# Patient Record
Sex: FEMALE | Race: BLACK OR AFRICAN AMERICAN | NOT HISPANIC OR LATINO | Employment: FULL TIME | URBAN - METROPOLITAN AREA
[De-identification: names, ages, dates, MRNs, and addresses within clinical notes are randomized per-mention and may not be internally consistent; named-entity substitution may affect disease eponyms.]

---

## 2023-04-26 DIAGNOSIS — E11.9 TYPE 2 DIABETES MELLITUS WITHOUT COMPLICATION, WITHOUT LONG-TERM CURRENT USE OF INSULIN (MULTI): Primary | ICD-10-CM

## 2023-04-26 RX ORDER — METFORMIN HYDROCHLORIDE 500 MG/1
TABLET, EXTENDED RELEASE ORAL 2 TIMES DAILY
COMMUNITY
Start: 2021-09-10 | End: 2023-04-26 | Stop reason: SDUPTHER

## 2023-04-27 RX ORDER — METFORMIN HYDROCHLORIDE 500 MG/1
500 TABLET, EXTENDED RELEASE ORAL 2 TIMES DAILY
Qty: 180 TABLET | Refills: 3 | Status: SHIPPED | OUTPATIENT
Start: 2023-04-27 | End: 2023-08-03 | Stop reason: DRUGHIGH

## 2023-05-03 ENCOUNTER — OFFICE VISIT (OUTPATIENT)
Dept: PRIMARY CARE | Facility: CLINIC | Age: 38
End: 2023-05-03
Payer: COMMERCIAL

## 2023-05-03 VITALS
HEIGHT: 64 IN | HEART RATE: 88 BPM | WEIGHT: 251 LBS | OXYGEN SATURATION: 96 % | BODY MASS INDEX: 42.85 KG/M2 | TEMPERATURE: 97.3 F | SYSTOLIC BLOOD PRESSURE: 129 MMHG | DIASTOLIC BLOOD PRESSURE: 80 MMHG

## 2023-05-03 DIAGNOSIS — E66.01 CLASS 3 SEVERE OBESITY DUE TO EXCESS CALORIES WITH SERIOUS COMORBIDITY AND BODY MASS INDEX (BMI) OF 40.0 TO 44.9 IN ADULT (MULTI): ICD-10-CM

## 2023-05-03 DIAGNOSIS — D50.9 IRON DEFICIENCY ANEMIA, UNSPECIFIED IRON DEFICIENCY ANEMIA TYPE: Primary | ICD-10-CM

## 2023-05-03 DIAGNOSIS — E11.9 TYPE 2 DIABETES MELLITUS WITHOUT COMPLICATION, WITHOUT LONG-TERM CURRENT USE OF INSULIN (MULTI): ICD-10-CM

## 2023-05-03 DIAGNOSIS — R05.1 ACUTE COUGH: ICD-10-CM

## 2023-05-03 LAB
ERYTHROCYTE DISTRIBUTION WIDTH (RATIO) BY AUTOMATED COUNT: 16.8 % (ref 11.5–14.5)
ERYTHROCYTE MEAN CORPUSCULAR HEMOGLOBIN CONCENTRATION (G/DL) BY AUTOMATED: 29 G/DL (ref 32–36)
ERYTHROCYTE MEAN CORPUSCULAR VOLUME (FL) BY AUTOMATED COUNT: 73 FL (ref 80–100)
ERYTHROCYTES (10*6/UL) IN BLOOD BY AUTOMATED COUNT: 4.23 X10E12/L (ref 4–5.2)
HBA1C MFR BLD: 7.7 % (ref 4.2–6.5)
HEMATOCRIT (%) IN BLOOD BY AUTOMATED COUNT: 31 % (ref 36–46)
HEMOGLOBIN (G/DL) IN BLOOD: 9 G/DL (ref 12–16)
HEMOGLOBIN (PG) IN RETICULOCYTES: 21 PG (ref 28–38)
IMMATURE RETIC FRACTION: 34.9 % (ref 0–16)
LEUKOCYTES (10*3/UL) IN BLOOD BY AUTOMATED COUNT: 4.7 X10E9/L (ref 4.4–11.3)
NRBC (PER 100 WBCS) BY AUTOMATED COUNT: 0 /100 WBC (ref 0–0)
PLATELETS (10*3/UL) IN BLOOD AUTOMATED COUNT: 262 X10E9/L (ref 150–450)
RETICULOCYTES (10*3/UL) IN BLOOD: 0.07 X10E12/L (ref 0.02–0.08)
RETICULOCYTES/100 ERYTHROCYTES IN BLOOD BY AUTOMATED COUNT: 1.6 % (ref 0.5–2)

## 2023-05-03 PROCEDURE — 83540 ASSAY OF IRON: CPT

## 2023-05-03 PROCEDURE — 82746 ASSAY OF FOLIC ACID SERUM: CPT

## 2023-05-03 PROCEDURE — 3074F SYST BP LT 130 MM HG: CPT | Performed by: INTERNAL MEDICINE

## 2023-05-03 PROCEDURE — 83036 HEMOGLOBIN GLYCOSYLATED A1C: CPT | Performed by: INTERNAL MEDICINE

## 2023-05-03 PROCEDURE — 83550 IRON BINDING TEST: CPT

## 2023-05-03 PROCEDURE — 99214 OFFICE O/P EST MOD 30 MIN: CPT | Performed by: INTERNAL MEDICINE

## 2023-05-03 PROCEDURE — 36415 COLL VENOUS BLD VENIPUNCTURE: CPT

## 2023-05-03 PROCEDURE — 85027 COMPLETE CBC AUTOMATED: CPT

## 2023-05-03 PROCEDURE — 85045 AUTOMATED RETICULOCYTE COUNT: CPT

## 2023-05-03 PROCEDURE — 1036F TOBACCO NON-USER: CPT | Performed by: INTERNAL MEDICINE

## 2023-05-03 PROCEDURE — 3079F DIAST BP 80-89 MM HG: CPT | Performed by: INTERNAL MEDICINE

## 2023-05-03 PROCEDURE — 3008F BODY MASS INDEX DOCD: CPT | Performed by: INTERNAL MEDICINE

## 2023-05-03 PROCEDURE — 3051F HG A1C>EQUAL 7.0%<8.0%: CPT | Performed by: INTERNAL MEDICINE

## 2023-05-03 RX ORDER — TIRZEPATIDE 5 MG/.5ML
5 INJECTION, SOLUTION SUBCUTANEOUS
Qty: 2 ML | Refills: 3 | Status: SHIPPED | OUTPATIENT
Start: 2023-05-03 | End: 2023-09-11

## 2023-05-03 RX ORDER — BENZONATATE 100 MG/1
100 CAPSULE ORAL 3 TIMES DAILY PRN
Qty: 15 CAPSULE | Refills: 0 | Status: SHIPPED | OUTPATIENT
Start: 2023-05-03 | End: 2023-05-08

## 2023-05-03 NOTE — PROGRESS NOTES
Subjective   Leigh Mendez is a 38 y.o. female who presents for Follow-up and Cough.  HPI  Past medical history includes iron deficiency anemia, morbid obesity and NIDDM.    Recently with persistent and worsening iron deficiency anemia.  No signs of bleeding, normal/stable menses and menstrual bleeding.  She is taking folate acid with her multivitamin but does not know if iron is in it, she is not taking adequate oral iron at this time certainly.  She reports she was scheduled for an appointment but that the office call to reschedule and now her appointment will be on 6/23/2023.    Also reports that she ran out of metformin and was off of it for about a week.  Even when she takes the metformin, the great majority of the time she is only taking it once at night.  She does take the weekly Trulicity.    Also complaining of lingering cough for the past 2 weeks, was having symptoms of seasonal allergies and took Claritin but also not taking it regularly.  No other red flags, no sick contacts, no other symptoms.    **COPIED FORWARD FOR REFERENCE**      See visit 8/12/2022 regarding summary of anxiety, panic attacks and depression.  Doing quite well overall.  She continues on sertraline 25 mg daily, has been working with a therapist and has been back to work where things are better overall, superior has apparently been more cognizant of treating her better.    She has NIDDM. See visit 9/10/2021 regarding diabetes history and prior treatments.  At that time hemoglobin A1c was over 11% and she was started on Metformin 500 mg twice daily and Trulicity.  By March 2022 A1c improved 7.2%.  Unfortunately, went off metformin with hemoglobin A1c increasing to 8.1%.  She has not missed any of her Trulicity.  Restarted metformin July 2022 along with Invokana but has had recurrent yeast infections and therefore discontinued.  No yeast infections since discontinuation.  She has been able to increase metformin to 500 mg ER twice daily  "and tolerating it fairly well. Misses one dose per day several times per week.  Walking daily with her dog until recently when he was attacked and . Trying to improve diet.  See visit 2022 for history of dietary habits and issues.    Denies any polydipsia and polyuria, no vision changes, no other symptoms.    Her hematologist is Dr. Bautista, had two iron infusions and started on folate. She does have a history of PRBC transfusions (after ).     Her OB/GYN is Dr. Boland and Dr. Fletcher. She has a history of  section, cervical incompetence and cerclage. She is .      She is , unfortunately her  is suffering from metastatic sarcoma, possible new lesion identified. They have one son, 10-year-old Jurgen. 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  Nonsmoker, no alcohol use, no recreational drug use.   Objective   /80   Pulse 88   Temp 36.3 °C (97.3 °F)   Ht 1.626 m (5' 4\")   Wt 114 kg (251 lb)   SpO2 96%   BMI 43.08 kg/m²    Physical Exam  Gen: NAD, pleasant, A&Ox3  HEENT: PERRL, EOMI, MMM, OP clear  Neck: supple, no thyromegaly, no JVD, normal carotid upstroke  Pulm: lungs CTAB, good air movement  CV: RRR, no m/r/g, 2+ DP pulses  Abd: NABS, soft, NT, ND no HSM, central adiposity  Ext: no peripheral edema  Neuro: CN II-XII intact, no focal sensory or motor deficits, normal reflexes   Assessment/Plan   Anxiety and depression: Overall improved  -Continue sertraline 25 mg at bedtime (not taking regularly), consider weaning off as she has been in remission for over 6 months and she is not taking it regularly and complains of somnolence with the medication  -Continue therapy follow-up and practicing coping skills    Lingering cough: Likely related to allergies, recommend continuing daily antihistamine, can take benzonatate as needed for cough    NIDDM: Significantly improved from>11% to 6.6% on 10/14/2022 , Today day 2023 up to 7.7%, partially due to medication " noncompliance  -Continue metformin 500 mg ER twice daily and continue switching from  Trulicity to Mounjaro  -SGLT2 inhibitor previously not tolerated due to yeast infections; even with rechallenge  - nutrition referral declined  -Lipid panel from 11/29/2021: , HDL 36, LDL 74,   -Blood pressure much improved; with pregnancy plans, may not be worth starting ACE inhibitor or ARB, same with statin    Morbid obesity: Improving, slowly but steadily; recommend bariatric evaluation, weight management, she will consider based on above    ADARSH: Did not f/u with Dr. Bautista and Dr. Boland, not taking iron; recheck labs today; start oral iron, reports that hematology appointment is pending    Strong suspicion of sleep apnea: Referred to sleep medicine, HST; not completed    Health maintenance  -Mammogram: low susp symptoms, referrred to [facility] center for eval  -Pap: 8/20/2021  -DEXA: NA  -Last colonoscopy: NA  -Smoking history: never  -BMI: 42.5  -Counseled regarding diet and exercise  -Immunizations: ?Tdap, also can get pneumo given comorbidities  -Followup in 3 months  Problem List Items Addressed This Visit    None  Visit Diagnoses       Type 2 diabetes mellitus without complication, without long-term current use of insulin (CMS/[facility])        Relevant Orders    POCT Glycosylated Hemoglobin (HGB A1C) docked device                 Alan Figueroa MD

## 2023-05-04 PROBLEM — E11.9 TYPE 2 DIABETES MELLITUS WITHOUT COMPLICATION, WITHOUT LONG-TERM CURRENT USE OF INSULIN (MULTI): Status: ACTIVE | Noted: 2023-05-04

## 2023-05-04 PROBLEM — E66.01 CLASS 3 SEVERE OBESITY DUE TO EXCESS CALORIES WITH SERIOUS COMORBIDITY AND BODY MASS INDEX (BMI) OF 40.0 TO 44.9 IN ADULT (MULTI): Status: ACTIVE | Noted: 2023-05-04

## 2023-05-04 PROBLEM — D50.9 IRON DEFICIENCY ANEMIA: Status: ACTIVE | Noted: 2023-05-04

## 2023-05-04 PROBLEM — F33.1 MDD (MAJOR DEPRESSIVE DISORDER), RECURRENT EPISODE, MODERATE (MULTI): Status: ACTIVE | Noted: 2023-05-04

## 2023-05-04 PROBLEM — E66.813 CLASS 3 SEVERE OBESITY DUE TO EXCESS CALORIES WITH SERIOUS COMORBIDITY AND BODY MASS INDEX (BMI) OF 40.0 TO 44.9 IN ADULT: Status: ACTIVE | Noted: 2023-05-04

## 2023-05-04 LAB
FOLATE (NG/ML) IN SER/PLAS: 8.8 NG/ML
IRON (UG/DL) IN SER/PLAS: 10 UG/DL (ref 35–150)
IRON BINDING CAPACITY (UG/DL) IN SER/PLAS: 387 UG/DL (ref 240–445)
IRON SATURATION (%) IN SER/PLAS: 3 % (ref 25–45)

## 2023-05-04 RX ORDER — SERTRALINE HYDROCHLORIDE 25 MG/1
25 TABLET, FILM COATED ORAL DAILY
COMMUNITY
Start: 2022-06-30 | End: 2024-04-15

## 2023-08-03 ENCOUNTER — OFFICE VISIT (OUTPATIENT)
Dept: PRIMARY CARE | Facility: CLINIC | Age: 38
End: 2023-08-03
Payer: COMMERCIAL

## 2023-08-03 VITALS
OXYGEN SATURATION: 100 % | DIASTOLIC BLOOD PRESSURE: 84 MMHG | SYSTOLIC BLOOD PRESSURE: 119 MMHG | BODY MASS INDEX: 42.23 KG/M2 | WEIGHT: 246 LBS | HEART RATE: 98 BPM | TEMPERATURE: 97.5 F

## 2023-08-03 DIAGNOSIS — E66.01 CLASS 3 SEVERE OBESITY DUE TO EXCESS CALORIES WITH SERIOUS COMORBIDITY AND BODY MASS INDEX (BMI) OF 40.0 TO 44.9 IN ADULT (MULTI): Primary | ICD-10-CM

## 2023-08-03 DIAGNOSIS — E11.9 TYPE 2 DIABETES MELLITUS WITHOUT COMPLICATION, WITHOUT LONG-TERM CURRENT USE OF INSULIN (MULTI): ICD-10-CM

## 2023-08-03 DIAGNOSIS — D50.9 IRON DEFICIENCY ANEMIA, UNSPECIFIED IRON DEFICIENCY ANEMIA TYPE: ICD-10-CM

## 2023-08-03 LAB — HBA1C MFR BLD: 6.6 % (ref 4.2–6.5)

## 2023-08-03 PROCEDURE — 83036 HEMOGLOBIN GLYCOSYLATED A1C: CPT | Performed by: INTERNAL MEDICINE

## 2023-08-03 PROCEDURE — 3079F DIAST BP 80-89 MM HG: CPT | Performed by: INTERNAL MEDICINE

## 2023-08-03 PROCEDURE — 3074F SYST BP LT 130 MM HG: CPT | Performed by: INTERNAL MEDICINE

## 2023-08-03 PROCEDURE — 3044F HG A1C LEVEL LT 7.0%: CPT | Performed by: INTERNAL MEDICINE

## 2023-08-03 PROCEDURE — 99214 OFFICE O/P EST MOD 30 MIN: CPT | Performed by: INTERNAL MEDICINE

## 2023-08-03 PROCEDURE — 1036F TOBACCO NON-USER: CPT | Performed by: INTERNAL MEDICINE

## 2023-08-03 PROCEDURE — 3008F BODY MASS INDEX DOCD: CPT | Performed by: INTERNAL MEDICINE

## 2023-08-03 RX ORDER — METFORMIN HYDROCHLORIDE 500 MG/1
500 TABLET, EXTENDED RELEASE ORAL
Qty: 180 TABLET | Refills: 3 | Status: SHIPPED | OUTPATIENT
Start: 2023-08-03 | End: 2023-10-17 | Stop reason: SDUPTHER

## 2023-08-03 NOTE — PROGRESS NOTES
Subjective   Leigh Mendez is a 38 y.o. female who presents for Follow-up.  HPI  Past medical history includes iron deficiency anemia, morbid obesity and NIDDM.    Interim:  -Telemedicine consult with LIYA Mena [facility] anemia clinic (2023).  Differential is boilerplate.  Large standard panel ordered, duplicating much of testing already done.  I reviewed those labs, no amazing discoveries noted.  Started on iron bis-glycinate 28 mg daily with vitamin C and iron rich foods with 4-week follow-up visit.  Still not tolerating oral iron, GI intolerance.    Has noticed appetite decrease since starting Mounjaro.  Down 5lbs since last visit.        **COPIED FORWARD FOR REFERENCE**      See visit 2022 regarding summary of anxiety, panic attacks and depression.  Doing quite well overall.  She continues on sertraline 25 mg daily, has been working with a therapist and has been back to work where things are better overall, superior has apparently been more cognizant of treating her better.    She has NIDDM. See visit 9/10/2021 regarding diabetes history and prior treatments.  At that time hemoglobin A1c was over 11% and she was started on Metformin 500 mg twice daily and Trulicity.  By 2022 A1c improved 7.2%.  Unfortunately, went off metformin with hemoglobin A1c increasing to 8.1%.  She has not missed any of her Trulicity.  Restarted metformin 2022 along with Invokana but has had recurrent yeast infections and therefore discontinued.  No yeast infections since discontinuation.  She has been able to increase metformin to 500 mg ER twice daily and tolerating it fairly well. Misses one dose per day several times per week.  Walking daily with her dog until recently when he was attacked and . Trying to improve diet.  See visit 2022 for history of dietary habits and issues.    Denies any polydipsia and polyuria, no vision changes, no other symptoms.    Her hematologist is Dr. Bautista, had two iron  infusions and started on folate. She does have a history of PRBC transfusions (after ).     Her OB/GYN is Dr. Boland and Dr. Fletcher. She has a history of  section, cervical incompetence and cerclage. She is .      She is , unfortunately her  is suffering from metastatic sarcoma, possible new lesion identified. They have one son, 10-year-old Jurgen. 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  Nonsmoker, no alcohol use, no recreational drug use.   Objective   /84   Pulse 98   Temp 36.4 °C (97.5 °F)   Wt 112 kg (246 lb)   SpO2 100%   BMI 42.23 kg/m²    Physical Exam  Gen: NAD, pleasant, A&Ox3  HEENT: PERRL, EOMI, MMM, OP clear  Neck: supple, no thyromegaly, no JVD, normal carotid upstroke  Pulm: lungs CTAB, good air movement  CV: RRR, no m/r/g, 2+ DP pulses  Abd: NABS, soft, NT, ND no HSM, central adiposity  Ext: no peripheral edema  Neuro: CN II-XII intact, no focal sensory or motor deficits, normal reflexes   Assessment/Plan   Anxiety and depression: Overall improved  -Continue sertraline 25 mg at bedtime (not taking regularly), consider weaning off as she has been in remission for over 6 months and she is not taking it regularly and complains of somnolence with the medication  -Continue therapy follow-up and practicing coping skills    Low energy: most likely due to anemia    NIDDM: Significantly improved from>11% to 6.6%   -Continue metformin 500 mg ER  daily (tolerance at higher doses) and continue  Mounjaro 5mg  -SGLT2 inhibitor previously not tolerated due to yeast infections; even with rechallenge  - nutrition referral declined  -Lipid panel from 2021: , HDL 36, LDL 74,   -Blood pressure much improved; with pregnancy plans, may not be worth starting ACE inhibitor or ARB, same with statin    Morbid obesity: Improving, slowly but steadily; recommend bariatric evaluation, weight management, she will consider based on above    ADARSH: unclear cause;  intolerant to oral iron, will follow-up with Heme to request iron infusions; continue with iron-rich diet    Strong suspicion of sleep apnea: Referred to sleep medicine, HST; not completed    Health maintenance  -Mammogram: low susp symptoms, referrred to breast center for eval  -Pap: 8/20/2021  -DEXA: NA  -Last colonoscopy: NA  -Smoking history: never  -BMI: 42.5  -Counseled regarding diet and exercise  -Immunizations: ?Tdap, also can get pneumo given comorbidities  -Followup in 3 months  Problem List Items Addressed This Visit       Type 2 diabetes mellitus without complication, without long-term current use of insulin (CMS/[facility])    Relevant Orders    POCT Glycosylated Hemoglobin (HGB A1C) docked device            Alan Figueroa MD

## 2023-09-10 DIAGNOSIS — E11.9 TYPE 2 DIABETES MELLITUS WITHOUT COMPLICATION, WITHOUT LONG-TERM CURRENT USE OF INSULIN (MULTI): ICD-10-CM

## 2023-09-10 DIAGNOSIS — E66.01 CLASS 3 SEVERE OBESITY DUE TO EXCESS CALORIES WITH SERIOUS COMORBIDITY AND BODY MASS INDEX (BMI) OF 40.0 TO 44.9 IN ADULT (MULTI): ICD-10-CM

## 2023-09-11 RX ORDER — TIRZEPATIDE 5 MG/.5ML
INJECTION, SOLUTION SUBCUTANEOUS
Qty: 4 ML | Refills: 0 | Status: SHIPPED | OUTPATIENT
Start: 2023-09-11 | End: 2023-10-05

## 2023-10-04 DIAGNOSIS — E66.01 CLASS 3 SEVERE OBESITY DUE TO EXCESS CALORIES WITH SERIOUS COMORBIDITY AND BODY MASS INDEX (BMI) OF 40.0 TO 44.9 IN ADULT (MULTI): ICD-10-CM

## 2023-10-04 DIAGNOSIS — E11.9 TYPE 2 DIABETES MELLITUS WITHOUT COMPLICATION, WITHOUT LONG-TERM CURRENT USE OF INSULIN (MULTI): ICD-10-CM

## 2023-10-05 RX ORDER — TIRZEPATIDE 5 MG/.5ML
INJECTION, SOLUTION SUBCUTANEOUS
Qty: 4 ML | Refills: 3 | Status: SHIPPED | OUTPATIENT
Start: 2023-10-05 | End: 2024-01-22

## 2023-10-17 DIAGNOSIS — E11.9 TYPE 2 DIABETES MELLITUS WITHOUT COMPLICATION, WITHOUT LONG-TERM CURRENT USE OF INSULIN (MULTI): ICD-10-CM

## 2023-10-17 RX ORDER — METFORMIN HYDROCHLORIDE 500 MG/1
500 TABLET, EXTENDED RELEASE ORAL
Qty: 180 TABLET | Refills: 3 | Status: SHIPPED | OUTPATIENT
Start: 2023-10-17

## 2023-10-28 ENCOUNTER — HOSPITAL ENCOUNTER (OUTPATIENT)
Dept: RADIOLOGY | Facility: EXTERNAL LOCATION | Age: 38
Discharge: HOME | End: 2023-10-28
Payer: COMMERCIAL

## 2023-10-28 DIAGNOSIS — S90.512A ABRASION OF ANKLE, LEFT, INITIAL ENCOUNTER: ICD-10-CM

## 2023-12-07 ENCOUNTER — OFFICE VISIT (OUTPATIENT)
Dept: PRIMARY CARE | Facility: CLINIC | Age: 38
End: 2023-12-07
Payer: COMMERCIAL

## 2023-12-07 VITALS
HEART RATE: 96 BPM | OXYGEN SATURATION: 96 % | DIASTOLIC BLOOD PRESSURE: 79 MMHG | TEMPERATURE: 97.3 F | WEIGHT: 246 LBS | BODY MASS INDEX: 42.23 KG/M2 | SYSTOLIC BLOOD PRESSURE: 117 MMHG

## 2023-12-07 DIAGNOSIS — F33.1 MDD (MAJOR DEPRESSIVE DISORDER), RECURRENT EPISODE, MODERATE (MULTI): ICD-10-CM

## 2023-12-07 DIAGNOSIS — E11.9 TYPE 2 DIABETES MELLITUS WITHOUT COMPLICATION, WITHOUT LONG-TERM CURRENT USE OF INSULIN (MULTI): ICD-10-CM

## 2023-12-07 DIAGNOSIS — D50.9 IRON DEFICIENCY ANEMIA, UNSPECIFIED IRON DEFICIENCY ANEMIA TYPE: ICD-10-CM

## 2023-12-07 DIAGNOSIS — E66.01 CLASS 3 SEVERE OBESITY DUE TO EXCESS CALORIES WITH SERIOUS COMORBIDITY AND BODY MASS INDEX (BMI) OF 40.0 TO 44.9 IN ADULT (MULTI): Primary | ICD-10-CM

## 2023-12-07 LAB — HBA1C MFR BLD: 6.3 % (ref 4.2–6.5)

## 2023-12-07 PROCEDURE — 3044F HG A1C LEVEL LT 7.0%: CPT | Performed by: INTERNAL MEDICINE

## 2023-12-07 PROCEDURE — 83036 HEMOGLOBIN GLYCOSYLATED A1C: CPT | Mod: CLIA WAIVED TEST | Performed by: INTERNAL MEDICINE

## 2023-12-07 PROCEDURE — 3008F BODY MASS INDEX DOCD: CPT | Performed by: INTERNAL MEDICINE

## 2023-12-07 PROCEDURE — 99214 OFFICE O/P EST MOD 30 MIN: CPT | Performed by: INTERNAL MEDICINE

## 2023-12-07 PROCEDURE — 1036F TOBACCO NON-USER: CPT | Performed by: INTERNAL MEDICINE

## 2023-12-07 PROCEDURE — 3078F DIAST BP <80 MM HG: CPT | Performed by: INTERNAL MEDICINE

## 2023-12-07 PROCEDURE — 3074F SYST BP LT 130 MM HG: CPT | Performed by: INTERNAL MEDICINE

## 2023-12-07 NOTE — PROGRESS NOTES
Subjective   Leigh Mendez is a 38 y.o. female who presents for Follow-up.  HPI  Past medical history includes iron deficiency anemia, morbid obesity and NIDDM.    Interim:  -Had follow-up with LIYA Mena, [facility] anemia clinic (2023); does not appear there is any cause or further diagnostics considered for her iron deficiency anemia.  Has had several Venofer infusions and recently recommended to try liquid Anitra iron.    Overall she is doing well, weight is stable.  She had noticed appetite decrease since starting Mounjaro with some weight loss initially but none since then.    See visit 2022 regarding summary of anxiety, panic attacks and depression.  Doing quite well overall.  She continues on sertraline 25 mg daily, has been working with a therapist and has been back to work where things are better overall, superior has apparently been more cognizant of treating her better.  She reports she tried getting off the sertraline and noticed worsening symptoms so has been back on it, comfortable with that, happy that it is working for her.    **COPIED FORWARD FOR REFERENCE**          She has NIDDM. See visit 9/10/2021 regarding diabetes history and prior treatments.  At that time hemoglobin A1c was over 11% and she was started on Metformin 500 mg twice daily and Trulicity.  By 2022 A1c improved 7.2%.  Unfortunately, went off metformin with hemoglobin A1c increasing to 8.1%.  She has not missed any of her Trulicity.  Restarted metformin 2022 along with Invokana but has had recurrent yeast infections and therefore discontinued.  No yeast infections since discontinuation.  She has been able to increase metformin to 500 mg ER twice daily and tolerating it fairly well. Misses one dose per day several times per week.  Walking daily with her dog until recently when he was attacked and . Trying to improve diet.  See visit 2022 for history of dietary habits and issues.    Denies any polydipsia  and polyuria, no vision changes, no other symptoms.    Her hematologist is Dr. Bautista, had two iron infusions and started on folate. She does have a history of PRBC transfusions (after ).     Her OB/GYN is Dr. Boland and Dr. Fletcher. She has a history of  section, cervical incompetence and cerclage. She is .      She is , unfortunately her  is suffering from metastatic sarcoma, possible new lesion identified. They have one son, 10-year-old Jurgen. 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  Nonsmoker, no alcohol use, no recreational drug use.   Objective   /79   Pulse 96   Temp 36.3 °C (97.3 °F)   Wt 112 kg (246 lb)   SpO2 96%   BMI 42.23 kg/m²    Physical Exam  Gen: NAD, pleasant, A&Ox3  HEENT: PERRL, EOMI, MMM, OP clear  Neck: supple, no thyromegaly, no JVD, normal carotid upstroke  Pulm: lungs CTAB, good air movement  CV: RRR, no m/r/g, 2+ DP pulses  Abd: NABS, soft, NT, ND no HSM, central adiposity  Ext: no peripheral edema  Neuro: CN II-XII intact, no focal sensory or motor deficits, normal reflexes   Assessment/Plan   Anxiety and depression: Overall improved  -Continue sertraline 25 mg at bedtime (not taking regularly), consider weaning off as she has been in remission for over 6 months and she is not taking it regularly and complains of somnolence with the medication  -Continue therapy follow-up and practicing coping skills    Low energy: most likely due to anemia; similarly improvement noted.    NIDDM: Significantly improved from>11% to 6.3%   -Continue metformin 500 mg ER  daily (tolerance at higher doses) and continue  Mounjaro 5mg  -SGLT2 inhibitor previously not tolerated due to yeast infections; even with rechallenge  - nutrition referral declined  -Lipid panel from 2023 reviewed, suboptimal but adequate  -blood pressure is well-controlled and not on Ace/ARB  - recommend statin for NIDDM despite adequate levels    Morbid obesity: Improving, slowly but  steadily; recommend bariatric evaluation, weight management, she will consider based on above    ADARSH: unclear cause; intolerant to oral iron, will follow-up with Heme; continue with iron-rich diet    Strong suspicion of sleep apnea: Referred to sleep medicine, HST; not completed, less of a concern to the patient since anemia has improved    Health maintenance  -Mammogram: low susp symptoms, referrred to breast center for eval  -Pap: 8/20/2021  -DEXA: NA  -Last colonoscopy: NA  -Smoking history: never  -BMI: 42.5  -Counseled regarding diet and exercise  -Immunizations: ?Tdap, also can get pneumo given comorbidities  -Followup in 3 months  Problem List Items Addressed This Visit       Type 2 diabetes mellitus without complication, without long-term current use of insulin (CMS/[facility])    Relevant Orders    POCT Glycosylated Hemoglobin (HGB A1C) docked device            Alan Figueroa MD

## 2024-01-22 DIAGNOSIS — E66.01 CLASS 3 SEVERE OBESITY DUE TO EXCESS CALORIES WITH SERIOUS COMORBIDITY AND BODY MASS INDEX (BMI) OF 40.0 TO 44.9 IN ADULT (MULTI): ICD-10-CM

## 2024-01-22 DIAGNOSIS — E11.9 TYPE 2 DIABETES MELLITUS WITHOUT COMPLICATION, WITHOUT LONG-TERM CURRENT USE OF INSULIN (MULTI): ICD-10-CM

## 2024-01-22 RX ORDER — TIRZEPATIDE 5 MG/.5ML
INJECTION, SOLUTION SUBCUTANEOUS
Qty: 4 ML | Refills: 3 | Status: SHIPPED | OUTPATIENT
Start: 2024-01-22 | End: 2024-05-28

## 2024-04-11 ENCOUNTER — OFFICE VISIT (OUTPATIENT)
Dept: PRIMARY CARE | Facility: CLINIC | Age: 39
End: 2024-04-11
Payer: COMMERCIAL

## 2024-04-11 VITALS
WEIGHT: 241 LBS | HEART RATE: 98 BPM | OXYGEN SATURATION: 96 % | TEMPERATURE: 97.3 F | DIASTOLIC BLOOD PRESSURE: 83 MMHG | SYSTOLIC BLOOD PRESSURE: 119 MMHG | BODY MASS INDEX: 41.37 KG/M2

## 2024-04-11 DIAGNOSIS — D50.9 IRON DEFICIENCY ANEMIA, UNSPECIFIED IRON DEFICIENCY ANEMIA TYPE: ICD-10-CM

## 2024-04-11 DIAGNOSIS — E11.9 TYPE 2 DIABETES MELLITUS WITHOUT COMPLICATION, WITHOUT LONG-TERM CURRENT USE OF INSULIN (MULTI): ICD-10-CM

## 2024-04-11 DIAGNOSIS — Z00.00 ENCOUNTER FOR WELLNESS EXAMINATION: Primary | ICD-10-CM

## 2024-04-11 DIAGNOSIS — E66.01 CLASS 3 SEVERE OBESITY DUE TO EXCESS CALORIES WITH SERIOUS COMORBIDITY AND BODY MASS INDEX (BMI) OF 40.0 TO 44.9 IN ADULT (MULTI): ICD-10-CM

## 2024-04-11 DIAGNOSIS — Z12.31 ENCOUNTER FOR SCREENING MAMMOGRAM FOR BREAST CANCER: ICD-10-CM

## 2024-04-11 LAB
ALBUMIN SERPL BCP-MCNC: 4.4 G/DL (ref 3.4–5)
ALP SERPL-CCNC: 75 U/L (ref 33–110)
ALT SERPL W P-5'-P-CCNC: 8 U/L (ref 7–45)
ANION GAP SERPL CALC-SCNC: 14 MMOL/L (ref 10–20)
AST SERPL W P-5'-P-CCNC: 9 U/L (ref 9–39)
BASOPHILS # BLD AUTO: 0.03 X10*3/UL (ref 0–0.1)
BASOPHILS NFR BLD AUTO: 0.5 %
BILIRUB SERPL-MCNC: 0.4 MG/DL (ref 0–1.2)
BUN SERPL-MCNC: 6 MG/DL (ref 6–23)
CALCIUM SERPL-MCNC: 9.9 MG/DL (ref 8.6–10.6)
CHLORIDE SERPL-SCNC: 102 MMOL/L (ref 98–107)
CHOLEST SERPL-MCNC: 194 MG/DL (ref 0–199)
CHOLESTEROL/HDL RATIO: 4.2
CO2 SERPL-SCNC: 26 MMOL/L (ref 21–32)
CREAT SERPL-MCNC: 0.63 MG/DL (ref 0.5–1.05)
EGFRCR SERPLBLD CKD-EPI 2021: >90 ML/MIN/1.73M*2
EOSINOPHIL # BLD AUTO: 0.29 X10*3/UL (ref 0–0.7)
EOSINOPHIL NFR BLD AUTO: 5 %
ERYTHROCYTE [DISTWIDTH] IN BLOOD BY AUTOMATED COUNT: 13.4 % (ref 11.5–14.5)
GLUCOSE SERPL-MCNC: 92 MG/DL (ref 74–99)
HBA1C MFR BLD: 6.4 % (ref 4.2–6.5)
HCT VFR BLD AUTO: 35.7 % (ref 36–46)
HDLC SERPL-MCNC: 46.4 MG/DL
HGB BLD-MCNC: 11.2 G/DL (ref 12–16)
HGB RETIC QN: 26 PG (ref 28–38)
IMM GRANULOCYTES # BLD AUTO: 0.01 X10*3/UL (ref 0–0.7)
IMM GRANULOCYTES NFR BLD AUTO: 0.2 % (ref 0–0.9)
IMMATURE RETIC FRACTION: 25.3 %
IRON SATN MFR SERPL: 8 % (ref 25–45)
IRON SERPL-MCNC: 29 UG/DL (ref 35–150)
LDLC SERPL CALC-MCNC: 118 MG/DL
LYMPHOCYTES # BLD AUTO: 1.57 X10*3/UL (ref 1.2–4.8)
LYMPHOCYTES NFR BLD AUTO: 27.1 %
MCH RBC QN AUTO: 24.1 PG (ref 26–34)
MCHC RBC AUTO-ENTMCNC: 31.4 G/DL (ref 32–36)
MCV RBC AUTO: 77 FL (ref 80–100)
MONOCYTES # BLD AUTO: 0.5 X10*3/UL (ref 0.1–1)
MONOCYTES NFR BLD AUTO: 8.6 %
NEUTROPHILS # BLD AUTO: 3.39 X10*3/UL (ref 1.2–7.7)
NEUTROPHILS NFR BLD AUTO: 58.6 %
NON HDL CHOLESTEROL: 148 MG/DL (ref 0–149)
NRBC BLD-RTO: 0 /100 WBCS (ref 0–0)
PLATELET # BLD AUTO: 281 X10*3/UL (ref 150–450)
POTASSIUM SERPL-SCNC: 3.5 MMOL/L (ref 3.5–5.3)
PROT SERPL-MCNC: 7.9 G/DL (ref 6.4–8.2)
RBC # BLD AUTO: 4.64 X10*6/UL (ref 4–5.2)
RETICS #: 0.08 X10*6/UL (ref 0.02–0.08)
RETICS/RBC NFR AUTO: 1.8 % (ref 0.5–2)
SODIUM SERPL-SCNC: 138 MMOL/L (ref 136–145)
TIBC SERPL-MCNC: 379 UG/DL (ref 240–445)
TRIGL SERPL-MCNC: 147 MG/DL (ref 0–149)
UIBC SERPL-MCNC: 350 UG/DL (ref 110–370)
VIT B12 SERPL-MCNC: 462 PG/ML (ref 211–911)
VLDL: 29 MG/DL (ref 0–40)
WBC # BLD AUTO: 5.8 X10*3/UL (ref 4.4–11.3)

## 2024-04-11 PROCEDURE — 85045 AUTOMATED RETICULOCYTE COUNT: CPT

## 2024-04-11 PROCEDURE — 83550 IRON BINDING TEST: CPT

## 2024-04-11 PROCEDURE — 36415 COLL VENOUS BLD VENIPUNCTURE: CPT

## 2024-04-11 PROCEDURE — 85025 COMPLETE CBC W/AUTO DIFF WBC: CPT

## 2024-04-11 PROCEDURE — 3008F BODY MASS INDEX DOCD: CPT | Performed by: INTERNAL MEDICINE

## 2024-04-11 PROCEDURE — 80061 LIPID PANEL: CPT

## 2024-04-11 PROCEDURE — 99214 OFFICE O/P EST MOD 30 MIN: CPT | Performed by: INTERNAL MEDICINE

## 2024-04-11 PROCEDURE — 1036F TOBACCO NON-USER: CPT | Performed by: INTERNAL MEDICINE

## 2024-04-11 PROCEDURE — 3074F SYST BP LT 130 MM HG: CPT | Performed by: INTERNAL MEDICINE

## 2024-04-11 PROCEDURE — 83036 HEMOGLOBIN GLYCOSYLATED A1C: CPT | Mod: CLIA WAIVED TEST | Performed by: INTERNAL MEDICINE

## 2024-04-11 PROCEDURE — 99396 PREV VISIT EST AGE 40-64: CPT | Performed by: INTERNAL MEDICINE

## 2024-04-11 PROCEDURE — 3079F DIAST BP 80-89 MM HG: CPT | Performed by: INTERNAL MEDICINE

## 2024-04-11 PROCEDURE — 3044F HG A1C LEVEL LT 7.0%: CPT | Performed by: INTERNAL MEDICINE

## 2024-04-11 PROCEDURE — 80053 COMPREHEN METABOLIC PANEL: CPT

## 2024-04-11 PROCEDURE — 83540 ASSAY OF IRON: CPT

## 2024-04-11 PROCEDURE — 82607 VITAMIN B-12: CPT

## 2024-04-11 NOTE — PROGRESS NOTES
Subjective   Leigh Mendez is a 39 y.o. female who presents for Annual Exam.  HPI  Past medical history includes iron deficiency anemia, morbid obesity and NIDDM.    Interim:  - has had iron infusions, reportedly improved but not normal; she recommended liquid iron    Overall she is doing well, weight is down 5 lbs.  She had noticed appetite decrease since starting Mounjaro with some weight loss but still craves sweets, lately will have at least one 5oz boxes of Milk Duds.  Does not always remember to take metformin either.  Helps her manage stress.    See visit 8/12/2022 regarding summary of anxiety, panic attacks and depression.  Doing quite well overall.  She continues on sertraline 25 mg but only takes it 4-5 times per week, feels like it makes her sleepy; completed therapy.  She reports she tried getting off the sertraline and noticed worsening symptoms so has been back on it, comfortable with that, happy that it is working for her.      She is , unfortunately her  is suffering from metastatic sarcoma, currently stable. They have one son, 10-year-old Jurgen. 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  Some exercise, walks the dog, has a gym membership but not going regularly.  Nonsmoker, no alcohol use, no recreational drug use.   Objective   /83   Pulse 98   Temp 36.3 °C (97.3 °F)   Wt 109 kg (241 lb)   SpO2 96%   BMI 41.37 kg/m²    Physical Exam  Gen: NAD, pleasant, A&Ox3  HEENT: PERRL, EOMI, MMM, OP clear  Neck: supple, no thyromegaly, no JVD, normal carotid upstroke  Pulm: lungs CTAB, good air movement  CV: RRR, no m/r/g, 2+ DP pulses  Abd: NABS, soft, NT, ND no HSM, central adiposity  Ext: no peripheral edema  Neuro: CN II-XII intact, no focal sensory or motor deficits, normal reflexes   Assessment/Plan     Anxiety and depression: Overall improved  -Continue sertraline 25 mg at bedtime (not taking regularly), tried weaning off but had partial relapse  -Continue practicing coping  skills acquired through therapist    Low energy: most likely due to anemia; similarly improvement noted.  Also with possible RADHA, not concerned today.    NIDDM: Significantly improved from>11% to 6.3%   -Continue metformin 500 mg ER  daily (intolerance at higher doses) and continue  Mounjaro 5mg  -SGLT2 inhibitor previously not tolerated due to yeast infections; even with rechallenge  - nutrition referral declined  -Lipid panel from 11/30/2023 reviewed, suboptimal   -blood pressure is well-controlled, not on Ace/ARB  - recommend statin for NIDDM despite adequate levels    Morbid obesity: Improving, slowly but steadily; recommend considering bariatric evaluation, weight management  -Extensive discussion today regarding diet especially sweets  -Continue Mounjaro, consider increasing    ADARSH: unclear cause; intolerant to oral iron  - Had follow-up with LIYA Mena [facility] anemia clinic (12/1/2023); does not appear there was any identified cause or further diagnostics considered for her iron deficiency anemia.  Has had several Venofer infusions and recently recommended to try liquid Anitra iron.    Strong suspicion of sleep apnea: Referred to sleep medicine, HST; not completed, less of a concern to the patient since anemia has improved    Health maintenance  -Mammogram: start screening mammograms  -Pap: 8/20/2021  -DEXA: NA  -Last colonoscopy: NA  -Smoking history: never  -BMI: 41.4  -Counseled regarding diet and exercise  -Immunizations: ?Tdap, also can get pneumo given comorbidities  -Followup in 4-6 months    Problem List Items Addressed This Visit       Type 2 diabetes mellitus without complication, without long-term current use of insulin (CMS/[facility])    Relevant Orders    POCT Glycosylated Hemoglobin (HGB A1C) docked device            Alan Figueroa MD

## 2024-04-15 DIAGNOSIS — F33.1 MDD (MAJOR DEPRESSIVE DISORDER), RECURRENT EPISODE, MODERATE (MULTI): Primary | ICD-10-CM

## 2024-04-15 DIAGNOSIS — E11.9 TYPE 2 DIABETES MELLITUS WITHOUT COMPLICATION, WITHOUT LONG-TERM CURRENT USE OF INSULIN (MULTI): ICD-10-CM

## 2024-04-15 RX ORDER — SERTRALINE HYDROCHLORIDE 25 MG/1
25 TABLET, FILM COATED ORAL DAILY
Qty: 90 TABLET | Refills: 3 | Status: SHIPPED | OUTPATIENT
Start: 2024-04-15 | End: 2025-04-15

## 2024-04-15 RX ORDER — SERTRALINE HYDROCHLORIDE 25 MG/1
25 TABLET, FILM COATED ORAL DAILY
Qty: 90 TABLET | Refills: 3 | OUTPATIENT
Start: 2024-04-15

## 2024-04-15 RX ORDER — METFORMIN HYDROCHLORIDE 500 MG/1
500 TABLET, EXTENDED RELEASE ORAL
Qty: 100 TABLET | Refills: 3 | Status: SHIPPED | OUTPATIENT
Start: 2024-04-15 | End: 2025-05-20

## 2024-05-26 DIAGNOSIS — E66.01 CLASS 3 SEVERE OBESITY DUE TO EXCESS CALORIES WITH SERIOUS COMORBIDITY AND BODY MASS INDEX (BMI) OF 40.0 TO 44.9 IN ADULT (MULTI): ICD-10-CM

## 2024-05-26 DIAGNOSIS — E11.9 TYPE 2 DIABETES MELLITUS WITHOUT COMPLICATION, WITHOUT LONG-TERM CURRENT USE OF INSULIN (MULTI): ICD-10-CM

## 2024-05-28 RX ORDER — TIRZEPATIDE 5 MG/.5ML
5 INJECTION, SOLUTION SUBCUTANEOUS
Qty: 6 ML | Refills: 3 | Status: SHIPPED | OUTPATIENT
Start: 2024-06-02 | End: 2025-06-02

## 2024-07-19 ENCOUNTER — APPOINTMENT (OUTPATIENT)
Dept: PRIMARY CARE | Facility: CLINIC | Age: 39
End: 2024-07-19
Payer: COMMERCIAL

## 2024-09-19 ENCOUNTER — APPOINTMENT (OUTPATIENT)
Dept: PRIMARY CARE | Facility: CLINIC | Age: 39
End: 2024-09-19
Payer: COMMERCIAL

## 2024-09-19 VITALS
HEIGHT: 64 IN | DIASTOLIC BLOOD PRESSURE: 80 MMHG | WEIGHT: 237 LBS | HEART RATE: 97 BPM | BODY MASS INDEX: 40.46 KG/M2 | TEMPERATURE: 98 F | SYSTOLIC BLOOD PRESSURE: 115 MMHG | OXYGEN SATURATION: 98 %

## 2024-09-19 DIAGNOSIS — F33.1 MDD (MAJOR DEPRESSIVE DISORDER), RECURRENT EPISODE, MODERATE: ICD-10-CM

## 2024-09-19 DIAGNOSIS — L50.1 CHRONIC IDIOPATHIC URTICARIA: ICD-10-CM

## 2024-09-19 DIAGNOSIS — D50.9 IRON DEFICIENCY ANEMIA, UNSPECIFIED IRON DEFICIENCY ANEMIA TYPE: ICD-10-CM

## 2024-09-19 DIAGNOSIS — E66.01 CLASS 3 SEVERE OBESITY DUE TO EXCESS CALORIES WITH SERIOUS COMORBIDITY AND BODY MASS INDEX (BMI) OF 40.0 TO 44.9 IN ADULT: ICD-10-CM

## 2024-09-19 DIAGNOSIS — E11.9 TYPE 2 DIABETES MELLITUS WITHOUT COMPLICATION, WITHOUT LONG-TERM CURRENT USE OF INSULIN (MULTI): Primary | ICD-10-CM

## 2024-09-19 LAB — HBA1C MFR BLD: 6.9 % (ref 4.2–6.5)

## 2024-09-19 PROCEDURE — 90471 IMMUNIZATION ADMIN: CPT | Performed by: INTERNAL MEDICINE

## 2024-09-19 PROCEDURE — 1036F TOBACCO NON-USER: CPT | Performed by: INTERNAL MEDICINE

## 2024-09-19 PROCEDURE — 3008F BODY MASS INDEX DOCD: CPT | Performed by: INTERNAL MEDICINE

## 2024-09-19 PROCEDURE — 99214 OFFICE O/P EST MOD 30 MIN: CPT | Performed by: INTERNAL MEDICINE

## 2024-09-19 PROCEDURE — 3049F LDL-C 100-129 MG/DL: CPT | Performed by: INTERNAL MEDICINE

## 2024-09-19 PROCEDURE — 83036 HEMOGLOBIN GLYCOSYLATED A1C: CPT | Mod: CLIA WAIVED TEST | Performed by: INTERNAL MEDICINE

## 2024-09-19 PROCEDURE — 3079F DIAST BP 80-89 MM HG: CPT | Performed by: INTERNAL MEDICINE

## 2024-09-19 PROCEDURE — 3044F HG A1C LEVEL LT 7.0%: CPT | Performed by: INTERNAL MEDICINE

## 2024-09-19 PROCEDURE — 90673 RIV3 VACCINE NO PRESERV IM: CPT | Performed by: INTERNAL MEDICINE

## 2024-09-19 PROCEDURE — 3074F SYST BP LT 130 MM HG: CPT | Performed by: INTERNAL MEDICINE

## 2024-09-19 ASSESSMENT — PAIN SCALES - GENERAL: PAINLEVEL: 0-NO PAIN

## 2024-09-19 NOTE — PROGRESS NOTES
"Subjective   Leigh Mendez is a 39 y.o. female who presents for Follow-up.  HPI  Past medical history includes iron deficiency anemia, morbid obesity and NIDDM.    Has been having hives daily.  Benadryl causes sedation.  Allegra 180mg daily suppresses but not relieving the breakouts.  Also triggered by heat.  Reports previous allergy evaluations.  Has not had as many breakouts as she had since July.    Overall she is doing well, weight is down another 4 lbs.  She had noticed appetite decrease since starting Mounjaro but does not want to increase the dose, concerned about nausea.  Does not always remember to take metformin either.  Prefers to be on less medications.    See visit 8/12/2022 regarding summary of anxiety, panic attacks and depression.  Doing quite well overall.  She continues on sertraline 25 mg, some sedation but takes it at night and overall happy with continuing.  Previously completed therapy.      She is , unfortunately her  is suffering from metastatic sarcoma, currently stable. They have one son, 10-year-old Jurgen. 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  Some exercise, walks the dog, has a gym membership but not going regularly.  Nonsmoker, no alcohol use, no recreational drug use.   Objective   /80 (BP Location: Right arm, Patient Position: Sitting, BP Cuff Size: Adult)   Pulse 97   Temp 36.7 °C (98 °F) (Temporal)   Ht 1.626 m (5' 4\")   Wt 108 kg (237 lb)   SpO2 98%   BMI 40.68 kg/m²    Physical Exam  Gen: NAD, pleasant, A&Ox3  HEENT: PERRL, EOMI, MMM, OP clear  Neck: supple, no thyromegaly, no JVD, normal carotid upstroke  Pulm: lungs CTAB, good air movement  CV: RRR, no m/r/g, 2+ DP pulses  Abd: NABS, soft, NT, ND no HSM, central adiposity  Ext: no peripheral edema  Neuro: CN II-XII intact, no focal sensory or motor deficits, normal reflexes   Assessment/Plan     Chronic urticaria:  - increase fexofenadine to 180mg BID  - consider follow-up with " allergy/immunology    Anxiety and depression: Overall improved  -Continue sertraline 25 mg at bedtime (not taking regularly), tried weaning off but had partial relapse  -Continue practicing coping skills acquired through therapist    Low energy: most likely due to anemia; supported by improvement in energy with resolution of anemia; therefore we have deferred testing for sleep apnea    NIDDM: Significantly improved from>11% to 6.3%   -Continue metformin 500 mg ER  daily (intolerance at higher doses) and continue  Mounjaro 5mg  -SGLT2 inhibitor previously not tolerated due to yeast infections; even with rechallenge  - nutrition referral declined  -Lipid panel from 4/11/2024 reviewed, suboptimal   -blood pressure is well-controlled, not on Ace/ARB  - recommend statin for NIDDM irrespective of cholesterol levels, reticent    Morbid obesity: Improving, slowly but steadily; previously discussed considering bariatric evaluation, weight management  -Extensive discussion today regarding diet especially sweets  -Continue Mounjaro, consider increasing    ADARSH: unclear cause; intolerant to oral iron  - Had follow-up with [facility] (12/1/2023); does not appear there was any identified cause or further diagnostics considered for her iron deficiency anemia.  Has had several Venofer infusions with improvement   -Recommend maintaining with liquid polysaccharide iron complex     Strong suspicion of sleep apnea: Referred to sleep medicine, HST; not completed, less of a concern to the patient since anemia has improved    Health maintenance  -Mammogram: start screening mammograms  -Pap: 8/20/2021  -DEXA: NA  -Last colonoscopy: NA  -Smoking history: never  -BMI: 40.68  -Counseled regarding diet and exercise  -Immunizations: ?Tdap, should get pneumo given comorbidities  -Followup in 3 months    Problem List Items Addressed This Visit    None             Alan Figueroa MD

## 2024-11-29 ENCOUNTER — HOSPITAL ENCOUNTER (OUTPATIENT)
Dept: RADIOLOGY | Facility: CLINIC | Age: 39
Discharge: HOME | End: 2024-11-29
Payer: COMMERCIAL

## 2024-11-29 DIAGNOSIS — Z12.31 ENCOUNTER FOR SCREENING MAMMOGRAM FOR BREAST CANCER: ICD-10-CM

## 2024-11-29 PROCEDURE — 77063 BREAST TOMOSYNTHESIS BI: CPT | Performed by: RADIOLOGY

## 2024-11-29 PROCEDURE — 77067 SCR MAMMO BI INCL CAD: CPT | Performed by: RADIOLOGY

## 2024-11-29 PROCEDURE — 77063 BREAST TOMOSYNTHESIS BI: CPT

## 2024-12-05 DIAGNOSIS — R92.8 ABNORMAL SCREENING MAMMOGRAM: Primary | ICD-10-CM

## 2024-12-05 DIAGNOSIS — Z12.31 ENCOUNTER FOR SCREENING MAMMOGRAM FOR BREAST CANCER: ICD-10-CM

## 2024-12-05 NOTE — PROGRESS NOTES
Subjective   Patient ID: Leigh Mendez is a 39 y.o. female who presents for No chief complaint on file..      Objective   Physical Exam    LMP 11/15/2024 (Approximate)        Assessment/Plan         Alan Figueroa MD

## 2024-12-16 ENCOUNTER — HOSPITAL ENCOUNTER (OUTPATIENT)
Dept: RADIOLOGY | Facility: CLINIC | Age: 39
Discharge: HOME | End: 2024-12-16
Payer: COMMERCIAL

## 2024-12-16 DIAGNOSIS — Z12.31 ENCOUNTER FOR SCREENING MAMMOGRAM FOR BREAST CANCER: ICD-10-CM

## 2024-12-16 DIAGNOSIS — R92.8 ABNORMAL SCREENING MAMMOGRAM: ICD-10-CM

## 2024-12-16 PROCEDURE — 77066 DX MAMMO INCL CAD BI: CPT | Performed by: STUDENT IN AN ORGANIZED HEALTH CARE EDUCATION/TRAINING PROGRAM

## 2024-12-16 PROCEDURE — 77066 DX MAMMO INCL CAD BI: CPT

## 2024-12-16 PROCEDURE — 77062 BREAST TOMOSYNTHESIS BI: CPT | Performed by: STUDENT IN AN ORGANIZED HEALTH CARE EDUCATION/TRAINING PROGRAM

## 2025-01-16 ENCOUNTER — APPOINTMENT (OUTPATIENT)
Dept: PRIMARY CARE | Facility: CLINIC | Age: 40
End: 2025-01-16
Payer: COMMERCIAL

## 2025-01-16 VITALS
DIASTOLIC BLOOD PRESSURE: 89 MMHG | OXYGEN SATURATION: 98 % | BODY MASS INDEX: 39.64 KG/M2 | WEIGHT: 232.2 LBS | HEIGHT: 64 IN | HEART RATE: 99 BPM | SYSTOLIC BLOOD PRESSURE: 134 MMHG | TEMPERATURE: 97.3 F

## 2025-01-16 DIAGNOSIS — F33.1 MDD (MAJOR DEPRESSIVE DISORDER), RECURRENT EPISODE, MODERATE: ICD-10-CM

## 2025-01-16 DIAGNOSIS — E66.01 CLASS 2 SEVERE OBESITY DUE TO EXCESS CALORIES WITH SERIOUS COMORBIDITY AND BODY MASS INDEX (BMI) OF 39.0 TO 39.9 IN ADULT: ICD-10-CM

## 2025-01-16 DIAGNOSIS — D50.9 IRON DEFICIENCY ANEMIA, UNSPECIFIED IRON DEFICIENCY ANEMIA TYPE: ICD-10-CM

## 2025-01-16 DIAGNOSIS — E66.812 CLASS 2 SEVERE OBESITY DUE TO EXCESS CALORIES WITH SERIOUS COMORBIDITY AND BODY MASS INDEX (BMI) OF 39.0 TO 39.9 IN ADULT: ICD-10-CM

## 2025-01-16 DIAGNOSIS — E11.9 TYPE 2 DIABETES MELLITUS WITHOUT COMPLICATION, WITHOUT LONG-TERM CURRENT USE OF INSULIN (MULTI): Primary | ICD-10-CM

## 2025-01-16 LAB — HBA1C MFR BLD: 6.1 % (ref 4.2–6.5)

## 2025-01-16 PROCEDURE — 1036F TOBACCO NON-USER: CPT | Performed by: INTERNAL MEDICINE

## 2025-01-16 PROCEDURE — 3079F DIAST BP 80-89 MM HG: CPT | Performed by: INTERNAL MEDICINE

## 2025-01-16 PROCEDURE — 99214 OFFICE O/P EST MOD 30 MIN: CPT | Performed by: INTERNAL MEDICINE

## 2025-01-16 PROCEDURE — 3044F HG A1C LEVEL LT 7.0%: CPT | Performed by: INTERNAL MEDICINE

## 2025-01-16 PROCEDURE — 83036 HEMOGLOBIN GLYCOSYLATED A1C: CPT | Mod: CLIA WAIVED TEST | Performed by: INTERNAL MEDICINE

## 2025-01-16 PROCEDURE — 3075F SYST BP GE 130 - 139MM HG: CPT | Performed by: INTERNAL MEDICINE

## 2025-01-16 PROCEDURE — 3008F BODY MASS INDEX DOCD: CPT | Performed by: INTERNAL MEDICINE

## 2025-01-16 RX ORDER — MINERAL OIL
180 ENEMA (ML) RECTAL DAILY
COMMUNITY

## 2025-01-16 NOTE — PROGRESS NOTES
"Subjective   MIKALA Mendez is a 39 y.o. female who presents for Follow-up.  HPI  Past medical history includes iron deficiency anemia, morbid obesity and NIDDM.    Overall she is doing well, weight is down another 5 lbs, ~15-20lbs this year.  She had noticed appetite decrease since starting Mounjaro but does not want to increase the dose, concerned about nausea.  Does not always remember to take metformin either. Takes about 5x/week.    See visit 2022 regarding summary of anxiety, panic attacks and depression.  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  Mother  of Alz dx in October, still processing.  Seeing therapist weekly at this time.  She continues on sertraline 25 mg, some sedation but takes it at night and overall happy with continuing.     She is , unfortunately her  is suffering from metastatic sarcoma, currently stable. They have one son, 10-year-old Jurgen.   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  Some exercise, walks the dog, has a gym membership but not going regularly.  Nonsmoker, no alcohol use, no recreational drug use.   Objective   /89 (BP Location: Left arm, Patient Position: Sitting, BP Cuff Size: Adult)   Pulse 99   Temp 36.3 °C (97.3 °F) (Temporal)   Ht 1.626 m (5' 4\")   Wt 105 kg (232 lb 3.2 oz)   SpO2 98%   BMI 39.86 kg/m²    Physical Exam  Gen: NAD, pleasant, A&Ox3  HEENT: PERRL, EOMI, MMM, OP clear  Neck: supple, no thyromegaly, no JVD, normal carotid upstroke  Pulm: lungs CTAB, good air movement  CV: RRR, no m/r/g, 2+ DP pulses  Abd: NABS, soft, NT, ND no HSM, central adiposity  Ext: no peripheral edema  Neuro: CN II-XII intact, no focal sensory or motor deficits, normal reflexes   Assessment/Plan     Chronic urticaria: improved  - continue fexofenadine 180mg daily or prn; BID for flares  - consider follow-up with allergy/immunology    Anxiety and depression: Overall improved  -Continue " sertraline 25 mg at bedtime (not taking regularly), tried weaning off but had partial relapse  -Continue practicing coping skills acquired through therapist    Low energy: most likely due to anemia; supported by improvement in energy with resolution of anemia; therefore we have deferred testing for sleep apnea    NIDDM: Significantly improved from>11%  - A1c (1/16/2025) 6.1%  -Continue metformin 500 mg ER  daily (intolerance at higher doses) and continue  Mounjaro 5mg  -SGLT2 inhibitor previously not tolerated due to yeast infections; even with rechallenge  - nutrition referral declined  -Lipid panel from 11/29/2024 reviewed, suboptimal   -blood pressure is adequately controlled, not on Ace/ARB and reticent to start  - recommend statin for NIDDM irrespective of cholesterol levels, reticent    Morbid obesity: Improving, slowly but steadily; previously discussed considering bariatric evaluation, weight management  -Extensive discussion today regarding diet especially sweets  -Continue Mounjaro, consider increasing    ADARSH: unclear cause; intolerant to oral iron  - Had follow-up with LIYA Mena [facility] anemia clinic (12/1/2023); does not appear there was any identified cause or further diagnostics considered for her iron deficiency anemia.  Has had several Venofer infusions with improvement   -Recommend maintaining with liquid polysaccharide iron complex, taking and tolerating 3-4x/week    Strong suspicion of sleep apnea: Referred to sleep medicine, HST; not completed, less of a concern to the patient since anemia has improved    Health maintenance  -Mammogram: 12/16/2024, continue annual  -Pap: 8/20/2021  -DEXA: NA  -Last colonoscopy: NA  -Smoking history: never  -BMI: 39.86  -Counseled regarding diet and exercise  -Immunizations: ?Tdap, should get pneumo given comorbidities  -Followup in 3 months    Problem List Items Addressed This Visit       Type 2 diabetes mellitus without complication, without long-term  current use of insulin (Multi) - Primary    Relevant Orders    POCT Glycosylated Hemoglobin (HGB A1C) docked device              Alan Figueroa MD

## 2025-05-04 DIAGNOSIS — E11.9 TYPE 2 DIABETES MELLITUS WITHOUT COMPLICATION, WITHOUT LONG-TERM CURRENT USE OF INSULIN: ICD-10-CM

## 2025-05-04 DIAGNOSIS — E66.813 CLASS 3 SEVERE OBESITY DUE TO EXCESS CALORIES WITH SERIOUS COMORBIDITY AND BODY MASS INDEX (BMI) OF 40.0 TO 44.9 IN ADULT: ICD-10-CM

## 2025-05-05 RX ORDER — TIRZEPATIDE 5 MG/.5ML
INJECTION, SOLUTION SUBCUTANEOUS
Qty: 6 ML | Refills: 3 | Status: SHIPPED | OUTPATIENT
Start: 2025-05-05

## 2025-05-13 LAB
ALBUMIN SERPL-MCNC: 4.3 G/DL (ref 3.6–5.1)
BASOPHILS # BLD AUTO: 32 CELLS/UL (ref 0–200)
BASOPHILS NFR BLD AUTO: 0.5 %
BUN SERPL-MCNC: 7 MG/DL (ref 7–25)
BUN/CREAT SERPL: ABNORMAL (CALC) (ref 6–22)
CALCIUM SERPL-MCNC: 9.3 MG/DL (ref 8.6–10.2)
CHLORIDE SERPL-SCNC: 103 MMOL/L (ref 98–110)
CO2 SERPL-SCNC: 27 MMOL/L (ref 20–32)
CREAT SERPL-MCNC: 0.56 MG/DL (ref 0.5–0.99)
EGFRCR SERPLBLD CKD-EPI 2021: 118 ML/MIN/1.73M2
EOSINOPHIL # BLD AUTO: 391 CELLS/UL (ref 15–500)
EOSINOPHIL NFR BLD AUTO: 6.2 %
ERYTHROCYTE [DISTWIDTH] IN BLOOD BY AUTOMATED COUNT: 16.3 % (ref 11–15)
GLUCOSE SERPL-MCNC: 112 MG/DL (ref 65–99)
HCT VFR BLD AUTO: 30.2 % (ref 35–45)
HGB BLD-MCNC: 8.4 G/DL (ref 11.7–15.5)
IRON SATN MFR SERPL: 7 % (CALC) (ref 16–45)
IRON SERPL-MCNC: 26 MCG/DL (ref 40–190)
LYMPHOCYTES # BLD AUTO: 1588 CELLS/UL (ref 850–3900)
LYMPHOCYTES NFR BLD AUTO: 25.2 %
MCH RBC QN AUTO: 20 PG (ref 27–33)
MCHC RBC AUTO-ENTMCNC: 27.8 G/DL (ref 32–36)
MCV RBC AUTO: 71.9 FL (ref 80–100)
MONOCYTES # BLD AUTO: 561 CELLS/UL (ref 200–950)
MONOCYTES NFR BLD AUTO: 8.9 %
NEUTROPHILS # BLD AUTO: 3730 CELLS/UL (ref 1500–7800)
NEUTROPHILS NFR BLD AUTO: 59.2 %
PHOSPHATE SERPL-MCNC: 3 MG/DL (ref 2.5–4.5)
PLATELET # BLD AUTO: 253 THOUSAND/UL (ref 140–400)
PMV BLD REES-ECKER: 9 FL (ref 7.5–12.5)
POTASSIUM SERPL-SCNC: 3.5 MMOL/L (ref 3.5–5.3)
RBC # BLD AUTO: 4.2 MILLION/UL (ref 3.8–5.1)
RETICS #: NORMAL CELLS/UL (ref 20000–80000)
RETICS/RBC NFR AUTO: 1.6 %
SODIUM SERPL-SCNC: 139 MMOL/L (ref 135–146)
TIBC SERPL-MCNC: 366 MCG/DL (CALC) (ref 250–450)
WBC # BLD AUTO: 6.3 THOUSAND/UL (ref 3.8–10.8)

## 2025-05-15 ENCOUNTER — APPOINTMENT (OUTPATIENT)
Dept: PRIMARY CARE | Facility: CLINIC | Age: 40
End: 2025-05-15
Payer: COMMERCIAL

## 2025-05-15 VITALS
DIASTOLIC BLOOD PRESSURE: 93 MMHG | BODY MASS INDEX: 40.75 KG/M2 | OXYGEN SATURATION: 95 % | WEIGHT: 237.4 LBS | HEART RATE: 101 BPM | SYSTOLIC BLOOD PRESSURE: 135 MMHG

## 2025-05-15 DIAGNOSIS — J30.2 SEASONAL ALLERGIC RHINITIS, UNSPECIFIED TRIGGER: ICD-10-CM

## 2025-05-15 DIAGNOSIS — E11.9 TYPE 2 DIABETES MELLITUS WITHOUT COMPLICATION, WITHOUT LONG-TERM CURRENT USE OF INSULIN: ICD-10-CM

## 2025-05-15 DIAGNOSIS — D50.9 IRON DEFICIENCY ANEMIA, UNSPECIFIED IRON DEFICIENCY ANEMIA TYPE: Primary | ICD-10-CM

## 2025-05-15 DIAGNOSIS — E66.813 CLASS 3 SEVERE OBESITY DUE TO EXCESS CALORIES WITH SERIOUS COMORBIDITY AND BODY MASS INDEX (BMI) OF 40.0 TO 44.9 IN ADULT: ICD-10-CM

## 2025-05-15 LAB — POC HEMOGLOBIN A1C: 6.6 % (ref 4.2–6.5)

## 2025-05-15 PROCEDURE — 83036 HEMOGLOBIN GLYCOSYLATED A1C: CPT | Performed by: INTERNAL MEDICINE

## 2025-05-15 PROCEDURE — 3044F HG A1C LEVEL LT 7.0%: CPT | Performed by: INTERNAL MEDICINE

## 2025-05-15 PROCEDURE — 3079F DIAST BP 80-89 MM HG: CPT | Performed by: INTERNAL MEDICINE

## 2025-05-15 PROCEDURE — 3075F SYST BP GE 130 - 139MM HG: CPT | Performed by: INTERNAL MEDICINE

## 2025-05-15 PROCEDURE — 99214 OFFICE O/P EST MOD 30 MIN: CPT | Performed by: INTERNAL MEDICINE

## 2025-05-15 RX ORDER — PANTOPRAZOLE SODIUM 40 MG/1
40 TABLET, DELAYED RELEASE ORAL DAILY
Qty: 30 TABLET | Refills: 0 | Status: SHIPPED | OUTPATIENT
Start: 2025-05-15 | End: 2025-06-14

## 2025-05-15 NOTE — PROGRESS NOTES
Subjective   Patient ID: MIKALA Mendez is a 40 y.o. female who presents for No chief complaint on file..    Past medical history includes iron deficiency anemia, morbid obesity and NIDDM.    Today,   -Taking iron 2x a week. Endorsing fatigue. No dyspnea, CP, palpitations, dizziness/lightheadedness. Normal menstrual periods, lasts 5 days. Doesn't have to change pads frequently. No melena, hematochezia.   -Gets sour sensation in stomach for past few months, doesn't feel like food is agreeing with her, no food triggers, occurs both with salads, fried foods. No burning, abdominal pain, nausea. Normal bowel movements. Mostly epigastric, sometimes radiates to right. Only occurs at night.   -Saw ENT years ago, nasal congestion, tried flonase, allegra but has not helped. Reports she has deviated spectrum and was recommended surgery previously, feels breathing is getting worse.   -Craving high sugar foods, like cotton candy. No polydipsia, polyuria, or blurred vision. Walks dog occasionally. Didn't find nutritionist helpful previously, would not eat recommendations. Eats out and at home. Drinks pop 3x a week, also drinks vitamin/energy drinks. Skips breakfast  -Mood up and down, seeing therapist. Lost mother recently    **COPIED FOR REFERENCE**     Overall she is doing well, weight is down another 5 lbs, ~15-20lbs this year.  She had noticed appetite decrease since starting Mounjaro but does not want to increase the dose, concerned about nausea.  Does not always remember to take metformin either. Takes about 5x/week.     See visit 2022 regarding summary of anxiety, panic attacks and depression.  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  Mother  of Alz dx in October, still processing.  Seeing therapist weekly at this time.  She continues on sertraline 25 mg, some sedation but takes it at night and overall happy with continuing.      She is , unfortunately her  is  suffering from metastatic sarcoma, currently stable. They have one son, 10-year-old Jurgen.   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  Some exercise, walks the dog, has a gym membership but not going regularly.  Nonsmoker, no alcohol use, no recreational drug use    Objective   BP (!) 135/93 (BP Location: Right arm)   Pulse 101   Wt 108 kg (237 lb 6.4 oz)   SpO2 95%   BMI 40.75 kg/m²     General: NAD, obese  HEENT: NCAT, EOMI  Cardiovascular: RRR, no murmurs appreciated  Pulmonary: CTAB, no wheezes appreciated, no coughing  Abdomen: soft, non-tender, non-distended  Extremities: warm, no LE edema  Skin: dry, no rashes or lesions  Neuro: A&Ox3, CN II-XII grossly intact     Assessment/Plan       ADARSH: unclear cause; intolerant to oral iron  - Had follow-up with LIYA Mena, [facility] anemia clinic (12/1/2023); does not appear there was any identified cause or further diagnostics considered for her iron deficiency anemia.  Has had several Venofer infusions with improvement   -Recommend maintaining with liquid polysaccharide iron complex, taking and tolerating 3-4x/week  -Instructed to follow up heme clinic    Abdominal discomfort with ADARSH  -No family history of GI malignancy  -FIT test  -Consider GI referral pending above  -Trial PPI, discussed possible EGD but patient prefers to think about it    Deviated septum/allergic rhinitis  -Referral to ENT per patient request    NIDDM: Significantly improved from>11%  -A1c (1/16/2025) 6.1%, 6.6 today  -Continue metformin 500 mg ER daily (intolerance at higher doses) and increase Mounjaro at next visit (just received new 3 month supply)   -SGLT2 inhibitor previously not tolerated due to yeast infections; even with rechallenge  -nutrition referral declined, extensively discussed dietary changes  -Lipid panel from 11/29/2024 reviewed, suboptimal   -blood pressure is adequately controlled, not on Ace/ARB and reticent to start  -recommend statin for  NIDDM irrespective of cholesterol levels, reticent     Morbid obesity: Improving, slowly but steadily; previously discussed considering bariatric evaluation, weight management  -Extensive discussion today regarding diet especially sweets  -Continue Mounjaro, increase at next visit        -----------not addressed today-------------  Chronic urticaria: improved  - continue fexofenadine 180mg daily or prn; BID for flares  - consider follow-up with allergy/immunology     Anxiety and depression: Overall improved  -Continue sertraline 25 mg at bedtime (not taking regularly), tried weaning off but had partial relapse  -Continue practicing coping skills acquired through therapist     Low energy: most likely due to anemia; supported by improvement in energy with resolution of anemia; therefore we have deferred testing for sleep apnea    Strong suspicion of sleep apnea: Referred to sleep medicine, HST; not completed, less of a concern to the patient since anemia has improved     Health maintenance  -Mammogram: 12/16/2024, continue annual  -Pap: 8/20/2021  -DEXA: NA  -Last colonoscopy: NA  -Smoking history: never  -BMI: 39.86  -Counseled regarding diet and exercise  -Immunizations: ?Tdap, should get pneumo given comorbidities  -Followup in 3 months

## 2025-07-22 ENCOUNTER — APPOINTMENT (OUTPATIENT)
Dept: OTOLARYNGOLOGY | Facility: CLINIC | Age: 40
End: 2025-07-22
Payer: COMMERCIAL

## 2025-09-25 ENCOUNTER — APPOINTMENT (OUTPATIENT)
Dept: PRIMARY CARE | Facility: CLINIC | Age: 40
End: 2025-09-25
Payer: COMMERCIAL

## 2026-01-21 ENCOUNTER — APPOINTMENT (OUTPATIENT)
Dept: PRIMARY CARE | Facility: CLINIC | Age: 41
End: 2026-01-21
Payer: COMMERCIAL